# Patient Record
Sex: MALE | Race: WHITE
[De-identification: names, ages, dates, MRNs, and addresses within clinical notes are randomized per-mention and may not be internally consistent; named-entity substitution may affect disease eponyms.]

---

## 2018-06-12 ENCOUNTER — HOSPITAL ENCOUNTER (EMERGENCY)
Dept: HOSPITAL 17 - PHEFT | Age: 18
Discharge: HOME | End: 2018-06-12
Payer: SELF-PAY

## 2018-06-12 VITALS — BODY MASS INDEX: 23.62 KG/M2 | WEIGHT: 184.09 LBS | HEIGHT: 74 IN

## 2018-06-12 VITALS
HEART RATE: 66 BPM | SYSTOLIC BLOOD PRESSURE: 157 MMHG | DIASTOLIC BLOOD PRESSURE: 66 MMHG | OXYGEN SATURATION: 97 % | TEMPERATURE: 99 F | RESPIRATION RATE: 16 BRPM

## 2018-06-12 DIAGNOSIS — S61.012A: Primary | ICD-10-CM
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
## 2018-06-12 DIAGNOSIS — Z23: ICD-10-CM

## 2018-06-12 PROCEDURE — 12001 RPR S/N/AX/GEN/TRNK 2.5CM/<: CPT

## 2018-06-12 PROCEDURE — 90471 IMMUNIZATION ADMIN: CPT

## 2018-06-12 PROCEDURE — 90714 TD VACC NO PRESV 7 YRS+ IM: CPT

## 2018-06-12 NOTE — PD
HPI


Chief Complaint:  Laceration/Skin Injury


Time Seen by Provider:  17:32


Travel History


International Travel<30 days:  No


Contact w/Intl Traveler<30days:  No


Traveled to known affect area:  No





History of Present Illness


HPI


17-year-old male here with a laceration to his left thumb.  He reports he cut 

the thumb prior to arrival with a knife.  He denies altered sensation of the 

digit.  Tetanus immunization is unknown.  He reports constant, throbbing pain 

at the site of the laceration.  Symptom severity is mild to moderate.





Critical access hospital


Past Medical History


Medical History:  Denies Significant Hx


Diminished Hearing:  No


Tetanus Vaccination:  Unknown


Influenza Vaccination:  No





Social History


Alcohol Use:  No


Tobacco Use:  No


Substance Use:  No





Allergies-Medications


(Allergen,Severity, Reaction):  


Coded Allergies:  


     No Known Allergies (Unverified , 6/12/18)


Reported Meds & Prescriptions





Reported Meds & Active Scripts


Active


No Active Prescriptions or Reported Medications    








Review of Systems


Except as stated in HPI:  all other systems reviewed are Neg





Physical Exam


Narrative


GENERAL: Alert and well-appearing 17-year-old male


SKIN: Laceration to the left thumb


HEAD: Normocephalic.


EYES:  No injection or drainage. 


NECK: Supple


CARDIOVASCULAR: Regular rate and rhythm 


RESPIRATORY: Breath sounds equal bilaterally. No accessory muscle use.


MUSCULOSKELETAL: No cyanosis, or edema.  Left hand: 1 cm flap laceration to the 

distal aspect of the thumb.  No foreign body visualized.  No nailbed injury.  

Normal sensation.  Brisk cap refill.  Is able to flex and extend the thumb 

against resistance.











Data


Data


Last Documented VS





Vital Signs








  Date Time  Temp Pulse Resp B/P (MAP) Pulse Ox O2 Delivery O2 Flow Rate FiO2


 


6/12/18 17:22 99.0 66 16 157/66 (96) 97   








Orders





 Orders


Tetanus/Diphtheria Tox Adult (Tetanus/Di (6/12/18 17:45)








MDM


Medical Decision Making


Medical Screen Exam Complete:  Yes


Emergency Medical Condition:  Yes


Differential Diagnosis


Flap laceration, tendon injury, nailbed injury


Narrative Course


17-year-old male here with a flap laceration to the left thumb.  The digit is 

neurovascularly intact.  Laceration repair performed.  Tetanus immunization 

updated.





Procedures


**Procedure Narrative**


LACERATION


LOCATION: Left thumb


LENGTH: 1 cm flap


NUMBER OF STITCHES/STAPLES: 5





REPAIR: The area of the laceration was prepped with Betadine and sterilely 

draped.  


Digital block performed using 1% lidocaine.  The wound was copiously irrigated 

and explored without evidence of foreign body, tendon injury or neurovascular  


injury.  The wound was closed using 3-0 Ethilon. This was a single layer 

repair. A sterile dressing was applied. The patient was  


advised to keep the dressing clean and dry. Patient tolerated the procedure 

well.





Diagnosis





 Primary Impression:  


 Finger laceration


 Qualified Codes:  S61.012A - Laceration without foreign body of left thumb 

without damage to nail, initial encounter


Referrals:  


Primary Care Physician





***Additional Instructions:  


Sutures need to be removed in 7-10 days.


Cleanse the area daily with soap and water


Keep covered with a clean dry dressing.


Scripts


No Active Prescriptions or Reported Meds


Disposition:  01 DISCHARGE HOME


Condition:  Stable











Blessing Painter Jun 12, 2018 18:33